# Patient Record
(demographics unavailable — no encounter records)

---

## 2024-10-21 NOTE — PHYSICAL EXAM
[External ears normal] : external ears normal [Normal] : patient has a normal gait [Attention Intact] : attention intact [Easily Distracted] : not easily distracted [Needs frequent redirecting] : does not need frequent redirecting [Able to redirect] : able to redirect [Difficulty shifting attention or transitioning] : no difficulty shifting attention or transitioning [Fidgets] : does not fidget [Moves quickly from one activity to another] : does not move quickly from one activity to another [Well-behaved during visit] : well-behaved during visit [Oppositional] : not oppositional [Cooperative when examined] : cooperative when examined [Appropriate eye contact] : appropriate eye contact [Smiles responsively] : smiles responsively [Withholding] : no withholding [Quiet/calm] : quiet/calm [Positive mood] : positive mood [Negative mood] : no negative mood [Hypersensitive] : not hypersensitive [Answered questions appropriately] : answered questions appropriately [Responds to name] : responds to name [Echolalia] : no echolalia [Joint attention noted] : joint attention noted [Social referencing noted] : social referencing noted [Difficult to engage in play] : not difficult to engage in play

## 2024-10-21 NOTE — HISTORY OF PRESENT ILLNESS
[Entering in September] : entering in September [Public] : Public [ICT: _____] : Integrated Co-teaching class (Collaborative Team Teaching) [unfilled] [Other: _____] : [unfilled] Detail Level: Zone [IEP] : Individualized Education Program Include Location In Plan?: Yes [Other: ____] : [unfilled] Hide Additional Notes?: No [OT: ____] : Occupational Therapy [unfilled] [S-L: _____] : Speech/Language Therapy [unfilled] [BIP] : Behavior Intervention Plan [TA: Other] : Other testing accommodations [12 mos.] : 12 - Month Special Service and/or Program: No [Spec. Transportation] : Special Transportation: No [FreeTextEntry4] : attends [employer]  [FreeTextEntry3] : dated 11/22/2022 (scanned into EMR). Annual review shceduled for 12/6/2023.  [FreeTextEntry2] : At time of IEP review (1st gr), RUI was functioning below grade level () for both Reading & Math  [TWNoteComboBox1] : 3rd Grade [FreeTextEntry1] : .ROSALINDA and Mom present for follow-up. Since last visit, .ROSALINDA has made significant progress both academically and behaviorally at school & home. Modifications made at home include consistent boundaries, limit setting and privileges taken away. Mom feels. ROSALINDA is not growing up and can understand/comprehend when she is given a plan beforehand (i.e., not getting any toys today at the store) and consequences when she does not listen/behave. Mom states with these home modifcications, .ROSALINDA's episodes of dysregulation has lessened (only 2 since last visit). Her sensory aversions have also improved. .HEATHERs hair can be styled in different ways (today is wearing pigtails). . ROSALINDA states she was "twinning" with her friend. . ROSALINDA now takes bath daily (in the regular tub) and has not problems with hair washing. However, brushing her teeth is still difficult. . ROSALINDA states she does not like the taste of the toothpaste (Mom has tried multiple brands). Will continue to work on finding a paste. ROSALINDA can tolerate. Clothing has been compromised where Mom found materials (usually soft) which. ROSALINDA prefers. ROSALINDA completed the SEED weekend program at her school and did "great". The programs helpedMalgorzata TELLEZ with her transitioning which is no longer an issue. She will start the SEED program in the Winter for the last time (allowed 3 sessions). . ROSALINDA has "Max" which is a weighted (6-lbs) baby doll that has helped her tremendously. "MAX" goes withMalgorzata TELLEZ everywhere (including today's office) and has helped with her regulation and coping skills. However. ROSALINDA know "Max" stays home while she is at school. No concerns with diet, elimination, sleep or any other issues or illness.  [FreeTextEntry6] : Summer 2024-- Oral surgery done under General anesthesia without any AE @ [facility], same day surgery.

## 2024-10-21 NOTE — PLAN
[Rationale for Medication Discussed] : The rationale for treating inattention, distractibility, hyperactivity, or impulsivity with medication was discussed. The desired effects, possible side effects, and need for monitoring response were reviewed. Information about various medication options was provided.  The option of not treating with medication was also discussed. [Cardiac risk factors for treatment] : Cardiac risk factors for treatment of stimulant medications were reviewed, including history of prior seizure, unexplained loss of consciousness, congenital heart disease, arrhythmias, or family history of sudden unexplained cardiac death in family members below the age of 40. [Medication Deferred] : After discussion with the family, the decision was made to defer consideration of treatment with medication. [Findings (To Date)] : Findings from evaluation (to date) [Clinical Basis] : Clinical basis for current diagnosis and clinical impressions [Co-Morbidities] : Clinical disorders and problem commonly associated with this child's condition (now or in the future) [Prognosis] : Prognosis [Dev. Therapies: ____] : Benefits and limits of developmental therapies: [unfilled] [Counseling] : Benefits and limits of counseling or therapy [Behavior Modification] : Behavior modification strategies [Resources] : Other available resources [CSE / IEP] : Committee on Special Education (CSE) evaluations and Individualized Education Programs (IEP) [Family Questions] : Family's questions were addressed [Diet] : Evidence-based clinical information about diet [Sleep] : The importance of sleep and strategies to ensure adequate sleep [Media / Screen Time] : Importance of limiting electronics, media, and screen time [Exercise] : Regular exercise [Reading] : Importance of daily reading [Injury Prevention] : injury prevention [FreeTextEntry1] :  Autism-- Doing well presently. Will continue to monitor and improved with the SEED program. Will monitor and refer to [facility] as needed.  Mom will forward the updated 2023-24 IEP (after the Nov-Dec 2024 annual meeting) for review.  ADHD-- Doing well presently. Will consider obtaining an updated Teacher DENNY-5 for further assessment if concerns arise.   Speech-- continue with therapies as per. ROSALINDA's IEP for Pragmatic Language Skill development.   Topics discussed with parent, refer to counseling section of note.  .Parent is aware to call the office as needed should any concerns or questions arise otherwise return in 6-8 months.

## 2024-10-21 NOTE — REASON FOR VISIT
[Follow-Up Visit] : a follow-up visit for [ADHD] : ADHD [Autism Spectrum Disorder] : autism spectrum disorder [Progress with Services] : progress with services [Speech/Language] : speech/language problems [Other: ____] : [unfilled] [Patient] : patient [Mother] : mother [FreeTextEntry4] : NONE [FreeTextEntry3] : Jan. 24, 2024

## 2025-07-14 NOTE — PHYSICAL EXAM
[External ears normal] : external ears normal [Person] : oriented to person [Place] : oriented to place [Normal] : patient has a normal gait [Attention Intact] : attention intact [Able to redirect] : able to redirect [Fidgets] : fidgets [Well-behaved during visit] : well-behaved during visit [Cooperative when examined] : cooperative when examined [Appropriate eye contact] : appropriate eye contact [Smiles responsively] : smiles responsively [Quiet/calm] : quiet/calm [Positive mood] : positive mood [Answered questions appropriately] : answered questions appropriately [Responds to name] : responds to name [Able to follow one step commands] : able to follow one step commands [Joint attention noted] : joint attention noted [Social referencing noted] : social referencing noted [Toe-Walking] : no toe-walking [Easily Distracted] : not easily distracted [Needs frequent redirecting] : does not need frequent redirecting [Difficulty shifting attention or transitioning] : no difficulty shifting attention or transitioning [Moves quickly from one activity to another] : does not move quickly from one activity to another [Oppositional] : not oppositional [Withholding] : no withholding [Negative mood] : no negative mood [Hypersensitive] : not hypersensitive [Echolalia] : no echolalia [Difficult to engage in play] : not difficult to engage in play [de-identified] : -- pleasant and easily engaged; answers questions appropriately without prompting; legs swinging while speaking --. ROSALINDA liked the past school year particularly her teachers, playing with her friends (in the school field, basketball and sidewalk chalk). The only thing she doesn't like about school is the bully classmate who also attends the summer camp.

## 2025-07-14 NOTE — HISTORY OF PRESENT ILLNESS
[Entering in September] : entering in September [Public] : Public [ICT: _____] : Integrated Co-teaching class (Collaborative Team Teaching) [unfilled] [Other: _____] : [unfilled] [IEP] : Individualized Education Program [Other: ____] : [unfilled] [OT: ____] : Occupational Therapy [unfilled] [BIP] : Behavior Intervention Plan [TA: Other] : Other testing accommodations [OHI] : Other Health Impairment [TA: Time: _____] : Extra time for tests [unfilled] [No Major Concerns] : No major concerns [Doing Well] : Doing well [12 mos.] : 12 - Month Special Service and/or Program: No [Spec. Transportation] : Special Transportation: No [FreeTextEntry4] : attends [employer]  [FreeTextEntry3] : dated 1/24/2025 with annual review scheduled for 01/21/2026.  [FreeTextEntry2] : At time of IEP review (3rd gr), RUI was functioning below grade level (2nd) Math & grade level for Reading.  [TWNoteComboBox1] : 3rd Grade [de-identified] : Mom would like more school support to work onMalgorzata TELLEZ's ADHD symptoms particularly on organization and executive functioning with her OT services. In math, Mom would like extra modifications and assessments due to OTILIOROSALINDA writing #'s incorrectly.  [FreeTextEntry1] : report card with mid 60-70's for [employer] & science r/t adhd-- stay on task, organization, comprehension and math r/t dyscalculia; social studies in the mid 80-90's bully at camp/school calls her fat..refer to nutritionist with f/u cse for adhd ot push in for executive and organization and math dyscalculia iep presented rtc in 6-8 months.  [de-identified] : 3rd grade report card reviewed with [employer] & Science in the 60-70 range related to ADHD characteristics: difficulty staying on task, organization and comprehension. Social studies in the mid 80-90's. And math poor related to learning difficulties. .ROSALINDA tends to see the number but writes it backwards or incorrectly (ie, 25 will be written as 52). .ROSALINDA likes reading chapter books (with pictures). [de-identified] : benefits from redirection and prompting per IEP [de-identified] : RUI gets along with all except for 1 male classmate who also is in her summer camp program. The student makes "fun of me" and callsMalgorzata TELLEZ "fat". Mom made aware.  [de-identified] : No concerns with sleep or elimination. .ROSALINDA's weight continues to increase as she will eat when bored. Also, her limited dietary preferences contribute to the struggle on alternates.  [de-identified] : .ROSALINDA travels with her weighted infant doll which helps her. [de-identified] : RUI is attending [employer] and enjoys it. [FreeTextEntry6] : Denies any recent illness, accidents, ER visits or hospitalizations since last visit.

## 2025-07-14 NOTE — REVIEW OF SYSTEMS
[Wgt Gain] : recent weight gain [Normal] : Psychiatric [Difficulty Falling Asleep] : no difficulty falling asleep [Difficulty Remaining Asleep] : no difficulty remaining asleep

## 2025-07-14 NOTE — PHYSICAL EXAM
[External ears normal] : external ears normal [Person] : oriented to person [Place] : oriented to place [Normal] : patient has a normal gait [Attention Intact] : attention intact [Able to redirect] : able to redirect [Fidgets] : fidgets [Well-behaved during visit] : well-behaved during visit [Cooperative when examined] : cooperative when examined [Appropriate eye contact] : appropriate eye contact [Smiles responsively] : smiles responsively [Quiet/calm] : quiet/calm [Positive mood] : positive mood [Answered questions appropriately] : answered questions appropriately [Responds to name] : responds to name [Able to follow one step commands] : able to follow one step commands [Joint attention noted] : joint attention noted [Social referencing noted] : social referencing noted [Toe-Walking] : no toe-walking [Easily Distracted] : not easily distracted [Needs frequent redirecting] : does not need frequent redirecting [Difficulty shifting attention or transitioning] : no difficulty shifting attention or transitioning [Moves quickly from one activity to another] : does not move quickly from one activity to another [Oppositional] : not oppositional [Withholding] : no withholding [Negative mood] : no negative mood [Hypersensitive] : not hypersensitive [Echolalia] : no echolalia [Difficult to engage in play] : not difficult to engage in play [de-identified] : -- pleasant and easily engaged; answers questions appropriately without prompting; legs swinging while speaking --. ROSALINDA liked the past school year particularly her teachers, playing with her friends (in the school field, basketball and sidewalk chalk). The only thing she doesn't like about school is the bully classmate who also attends the summer camp.

## 2025-07-14 NOTE — END OF VISIT
[Time Spent: ___ minutes] : I have spent [unfilled] minutes of time on the encounter which excludes teaching and separately reported services.
[Time Spent: ___ minutes] : I have spent [unfilled] minutes of time on the encounter which excludes teaching and separately reported services.
lungs clear

## 2025-07-14 NOTE — HISTORY OF PRESENT ILLNESS
[Entering in September] : entering in September [Public] : Public [ICT: _____] : Integrated Co-teaching class (Collaborative Team Teaching) [unfilled] [Other: _____] : [unfilled] [IEP] : Individualized Education Program [Other: ____] : [unfilled] [OT: ____] : Occupational Therapy [unfilled] [BIP] : Behavior Intervention Plan [TA: Other] : Other testing accommodations [OHI] : Other Health Impairment [TA: Time: _____] : Extra time for tests [unfilled] [No Major Concerns] : No major concerns [Doing Well] : Doing well [12 mos.] : 12 - Month Special Service and/or Program: No [Spec. Transportation] : Special Transportation: No [FreeTextEntry4] : attends [employer]  [FreeTextEntry3] : dated 1/24/2025 with annual review scheduled for 01/21/2026.  [FreeTextEntry2] : At time of IEP review (3rd gr), RUI was functioning below grade level (2nd) Math & grade level for Reading.  [TWNoteComboBox1] : 3rd Grade [de-identified] : Mom would like more school support to work onMalgorzata TELLEZ's ADHD symptoms particularly on organization and executive functioning with her OT services. In math, Mom would like extra modifications and assessments due to OTILIOROSALINDA writing #'s incorrectly.  [FreeTextEntry1] : report card with mid 60-70's for [employer] & science r/t adhd-- stay on task, organization, comprehension and math r/t dyscalculia; social studies in the mid 80-90's bully at camp/school calls her fat..refer to nutritionist with f/u cse for adhd ot push in for executive and organization and math dyscalculia iep presented rtc in 6-8 months.  [de-identified] : 3rd grade report card reviewed with [employer] & Science in the 60-70 range related to ADHD characteristics: difficulty staying on task, organization and comprehension. Social studies in the mid 80-90's. And math poor related to learning difficulties. .ROSALINDA tends to see the number but writes it backwards or incorrectly (ie, 25 will be written as 52). .ROSALINDA likes reading chapter books (with pictures). [de-identified] : benefits from redirection and prompting per IEP [de-identified] : RUI gets along with all except for 1 male classmate who also is in her summer camp program. The student makes "fun of me" and callsMalgorzata TELLEZ "fat". Mom made aware.  [de-identified] : No concerns with sleep or elimination. .ROSALINDA's weight continues to increase as she will eat when bored. Also, her limited dietary preferences contribute to the struggle on alternates.  [de-identified] : .ROSALINDA travels with her weighted infant doll which helps her. [de-identified] : RUI is attending [employer] and enjoys it. [FreeTextEntry6] : Denies any recent illness, accidents, ER visits or hospitalizations since last visit.

## 2025-07-14 NOTE — PLAN
[Findings (To Date)] : Findings from evaluation (to date) [Clinical Basis] : Clinical basis for current diagnosis and clinical impressions [Co-Morbidities] : Clinical disorders and problem commonly associated with this child's condition (now or in the future) [Prognosis] : Prognosis [Dev. Therapies: ____] : Benefits and limits of developmental therapies: [unfilled] [Counseling] : Benefits and limits of counseling or therapy [Behavior Modification] : Behavior modification strategies [Resources] : Other available resources [Family Questions] : Family's questions were addressed [Diet] : Evidence-based clinical information about diet [Sleep] : The importance of sleep and strategies to ensure adequate sleep [Media / Screen Time] : Importance of limiting electronics, media, and screen time [Exercise] : Regular exercise [Reading] : Importance of daily reading [Injury Prevention] : injury prevention [Rationale for Medication Discussed] : The rationale for treating inattention, distractibility, hyperactivity, or impulsivity with medication was discussed. The desired effects, possible side effects, and need for monitoring response were reviewed. Information about various medication options was provided.  The option of not treating with medication was also discussed. [Cardiac risk factors for treatment] : Cardiac risk factors for treatment of stimulant medications were reviewed, including history of prior seizure, unexplained loss of consciousness, congenital heart disease, arrhythmias, or family history of sudden unexplained cardiac death in family members below the age of 40. [Medication Deferred] : After discussion with the family, the decision was made to defer consideration of treatment with medication. [FreeTextEntry1] :  Autism-- Doing well socially academically and currently at Summer [employer]. Mom to speak to camp re: jeremy.  2024-25 IEP reviewed. Continue services. Referral given for CSE to include additional supports for ADHD with OT as push-in.  ADHD--  Consider obtaining an updated Teacher DENNY-5 for further assessment if concerns arise.   Dyscalculia-- referral given for CSE Team for assessment and incorporate supports in .ROSALINDA's IEP.  Speech-- continue with therapies as per. ROSALINDA's IEP for Pragmatic Language Skill development.   Topics discussed with parent, refer to counseling section of note.  .Parent is aware to call the office as needed should any concerns or questions arise otherwise return in 6-8 months.                  [Other: _____] : [unfilled]

## 2025-07-14 NOTE — REASON FOR VISIT
[Follow-Up Visit] : a follow-up visit for [ADHD] : ADHD [Autism Spectrum Disorder] : autism spectrum disorder [Learning Problems] : learning problems [Progress with Services] : progress with services [Speech/Language] : speech/language problems [Other: ____] : [unfilled] [Patient] : patient [Mother] : mother [IEP] : IEP [Report cards] : report cards [FreeTextEntry4] : NONE [FreeTextEntry3] : Oct 21, 2024